# Patient Record
Sex: FEMALE | Race: WHITE | NOT HISPANIC OR LATINO | URBAN - METROPOLITAN AREA
[De-identification: names, ages, dates, MRNs, and addresses within clinical notes are randomized per-mention and may not be internally consistent; named-entity substitution may affect disease eponyms.]

---

## 2017-01-31 ENCOUNTER — APPOINTMENT (RX ONLY)
Dept: URBAN - METROPOLITAN AREA OTHER 9 | Facility: OTHER | Age: 52
Setting detail: DERMATOLOGY
End: 2017-01-31

## 2017-01-31 DIAGNOSIS — L30.8 OTHER SPECIFIED DERMATITIS: ICD-10-CM

## 2017-01-31 DIAGNOSIS — L50.3 DERMATOGRAPHIC URTICARIA: ICD-10-CM

## 2017-01-31 PROBLEM — I10 ESSENTIAL (PRIMARY) HYPERTENSION: Status: ACTIVE | Noted: 2017-01-31

## 2017-01-31 PROCEDURE — ? PRESCRIPTION

## 2017-01-31 PROCEDURE — 99202 OFFICE O/P NEW SF 15 MIN: CPT

## 2017-01-31 PROCEDURE — ? COUNSELING

## 2017-01-31 PROCEDURE — ? TREATMENT REGIMEN

## 2017-01-31 RX ORDER — DESONIDE 0.5 MG/G
AEROSOL, FOAM TOPICAL
Qty: 1 | Refills: 3 | Status: CANCELLED

## 2017-01-31 RX ORDER — DESONIDE 0.5 MG/ML
LOTION TOPICAL
Qty: 1 | Refills: 3 | Status: ERX | COMMUNITY
Start: 2017-01-31

## 2017-01-31 RX ORDER — HYDROXYZINE HYDROCHLORIDE 10 MG/1
TABLET, FILM COATED ORAL QHS
Qty: 30 | Refills: 3 | Status: ERX | COMMUNITY
Start: 2017-01-31

## 2017-01-31 RX ADMIN — DESONIDE: 0.5 LOTION TOPICAL at 14:20

## 2017-01-31 RX ADMIN — HYDROXYZINE HYDROCHLORIDE: 10 TABLET, FILM COATED ORAL at 14:16

## 2017-01-31 ASSESSMENT — LOCATION SIMPLE DESCRIPTION DERM
LOCATION SIMPLE: RIGHT UPPER BACK
LOCATION SIMPLE: RIGHT ANTERIOR NECK

## 2017-01-31 ASSESSMENT — LOCATION ZONE DERM
LOCATION ZONE: NECK
LOCATION ZONE: TRUNK

## 2017-01-31 ASSESSMENT — LOCATION DETAILED DESCRIPTION DERM
LOCATION DETAILED: RIGHT INFERIOR ANTERIOR NECK
LOCATION DETAILED: RIGHT SUPERIOR UPPER BACK

## 2017-01-31 ASSESSMENT — SEVERITY ASSESSMENT: SEVERITY: MILD

## 2017-01-31 NOTE — HPI: RASH
How Severe Is Your Rash?: moderate
Is This A New Presentation, Or A Follow-Up?: Rash
Additional History: Patient states that the rash comes and goes sometimes it is itchy and spotty. Patient states sweat males rash worst.

## 2017-01-31 NOTE — PROCEDURE: TREATMENT REGIMEN
Detail Level: Simple
Otc Regimen: Claritin or Allegra
Samples Given: [place]
Plan: Take Atarax QHS prn for flares. Take Allegra or Claritin QD. Apply desonide BID x 3 days, prn for flares.
Initiate Treatment: Atarax

## 2020-08-26 ENCOUNTER — OFFICE VISIT (OUTPATIENT)
Dept: URBAN - METROPOLITAN AREA CLINIC 96 | Facility: CLINIC | Age: 55
End: 2020-08-26
Payer: COMMERCIAL

## 2020-08-26 ENCOUNTER — WEB ENCOUNTER (OUTPATIENT)
Dept: URBAN - METROPOLITAN AREA CLINIC 96 | Facility: CLINIC | Age: 55
End: 2020-08-26

## 2020-08-26 ENCOUNTER — LAB OUTSIDE AN ENCOUNTER (OUTPATIENT)
Dept: URBAN - METROPOLITAN AREA CLINIC 96 | Facility: CLINIC | Age: 55
End: 2020-08-26

## 2020-08-26 ENCOUNTER — DASHBOARD ENCOUNTERS (OUTPATIENT)
Age: 55
End: 2020-08-26

## 2020-08-26 DIAGNOSIS — K62.5 RECTAL BLEEDING: ICD-10-CM

## 2020-08-26 DIAGNOSIS — K59.00 CONSTIPATION, UNSPECIFIED CONSTIPATION TYPE: ICD-10-CM

## 2020-08-26 DIAGNOSIS — R19.4 CHANGE IN BOWEL HABITS: ICD-10-CM

## 2020-08-26 DIAGNOSIS — Z86.010 PERSONAL HISTORY OF COLONIC POLYPS: ICD-10-CM

## 2020-08-26 DIAGNOSIS — R19.7 DIARRHEA, UNSPECIFIED TYPE: ICD-10-CM

## 2020-08-26 PROBLEM — 428283002: Status: ACTIVE | Noted: 2020-08-25

## 2020-08-26 PROCEDURE — 99204 OFFICE O/P NEW MOD 45 MIN: CPT | Performed by: INTERNAL MEDICINE

## 2020-08-26 PROCEDURE — 99203 OFFICE O/P NEW LOW 30 MIN: CPT | Performed by: INTERNAL MEDICINE

## 2020-08-26 PROCEDURE — G8417 CALC BMI ABV UP PARAM F/U: HCPCS | Performed by: INTERNAL MEDICINE

## 2020-08-26 PROCEDURE — G9903 PT SCRN TBCO ID AS NON USER: HCPCS | Performed by: INTERNAL MEDICINE

## 2020-08-26 PROCEDURE — G8427 DOCREV CUR MEDS BY ELIG CLIN: HCPCS | Performed by: INTERNAL MEDICINE

## 2020-08-26 PROCEDURE — 3017F COLORECTAL CA SCREEN DOC REV: CPT | Performed by: INTERNAL MEDICINE

## 2020-08-26 RX ORDER — ONDANSETRON HYDROCHLORIDE 4 MG/1
1 TABLET AS NEEDED TABLET, FILM COATED ORAL ONCE A DAY
Qty: 2 | Refills: 0 | OUTPATIENT
Start: 2020-08-26

## 2020-08-26 RX ORDER — LOSARTAN POTASSIUM AND HYDROCHLOROTHIAZIDE 50; 12.5 MG/1; MG/1
1 TABLET TABLET, FILM COATED ORAL ONCE A DAY
Status: ACTIVE | COMMUNITY

## 2020-08-26 RX ORDER — SODIUM, POTASSIUM,MAG SULFATES 17.5-3.13G
354ML SOLUTION, RECONSTITUTED, ORAL ORAL AS DIRECTED
Qty: 354 MILLILITER | Refills: 0 | OUTPATIENT
Start: 2020-08-26 | End: 2020-08-27

## 2020-08-26 RX ORDER — ATORVASTATIN CALCIUM 20 MG/1
1 TABLET TABLET, FILM COATED ORAL ONCE A DAY
Status: ACTIVE | COMMUNITY

## 2020-08-26 NOTE — PHYSICAL EXAM GASTROINTESTINAL
Abdomen , soft, nontender, nondistended , no guarding or rigidity , no masses palpable , normal bowel sounds , RECTAL EXAM: no ext lesions, no stool in vault.  Liver and Spleen , no hepatomegaly present , no hepatosplenomegaly , liver nontender , spleen not palpable

## 2020-08-26 NOTE — HPI-TODAY'S VISIT:
This is a 55-year-old female referred by  Dr. Betzy Hahn for some rectal bleeding and diarrhea and constipation.  Patient had a directly scheduled colonoscopy with me on January 27, 2017.  This exam revealed internal hemorrhoids as well as 2 sessile, 2 to 3 mm sigmoid polyps that removed, multiple small polyps in the rectum as well that removed.  Pathology revealed hyperplastic polyps.  Pt says she has never had "normal poops" however last fall she was very constipated and had "dark stools". Pt had BMs then had 3 days and no BMs. Pt uses smooth move or miralax and it worked. In Feb back to her normal stools- she wiped and saw blood. No pain or itching. A month later, she had clumpy stools to normal to gel. Pt says since then, stool very pale and she goes from normal to ribbons to rabbit poops to normal. Pt is eating more protein and goes in the afternoon and takes mg at bedtime. No abdominal pain, no wt loss. Very bloated. Cousins had Crohns. Pt denies GERD. Her mom and siter had gb removed. Pt gets rashes on her neck- showed me pics on her cell that come and go and no one has apparently been able to dx why- she was told due to stress.  Pt works at home, works for [employer]

## 2020-08-28 LAB
A/G RATIO: 2
ALBUMIN: 4.5
ALKALINE PHOSPHATASE: 101
ALT (SGPT): 12
AST (SGOT): 14
BASO (ABSOLUTE): 0.1
BASOS: 1
BILIRUBIN, TOTAL: 0.3
BUN/CREATININE RATIO: 24
BUN: 17
CALCIUM: 9.1
CARBON DIOXIDE, TOTAL: 17
CHLORIDE: 103
CREATININE: 0.71
EGFR IF AFRICN AM: 112
EGFR IF NONAFRICN AM: 97
ENDOMYSIAL ANTIBODY IGA: NEGATIVE
EOS (ABSOLUTE): 0.2
EOS: 2
FREE THYROXINE INDEX: 1.6
GLOBULIN, TOTAL: 2.3
GLUCOSE: 98
HEMATOCRIT: 44.1
HEMATOLOGY COMMENTS:: (no result)
HEMOGLOBIN: 14.9
IMMATURE CELLS: (no result)
IMMATURE GRANS (ABS): 0.1
IMMATURE GRANULOCYTES: 1
IMMUNOGLOBULIN A, QN, SERUM: 262
LYMPHS (ABSOLUTE): 2.5
LYMPHS: 31
MCH: 29.7
MCHC: 33.8
MCV: 88
MONOCYTES(ABSOLUTE): 0.5
MONOCYTES: 6
NEUTROPHILS (ABSOLUTE): 4.8
NEUTROPHILS: 59
NRBC: (no result)
PLATELETS: 389
POTASSIUM: 4.3
PROTEIN, TOTAL: 6.8
RBC: 5.02
RDW: 12.9
SODIUM: 138
T-TRANSGLUTAMINASE (TTG) IGA: <2
T3 UPTAKE: 24
THYROXINE (T4): 6.5
WBC: 8.1

## 2020-09-10 ENCOUNTER — WEB ENCOUNTER (OUTPATIENT)
Dept: URBAN - METROPOLITAN AREA CLINIC 96 | Facility: CLINIC | Age: 55
End: 2020-09-10

## 2020-11-20 ENCOUNTER — OFFICE VISIT (OUTPATIENT)
Dept: URBAN - METROPOLITAN AREA SURGERY CENTER 18 | Facility: SURGERY CENTER | Age: 55
End: 2020-11-20
Payer: COMMERCIAL

## 2020-11-20 ENCOUNTER — CLAIMS CREATED FROM THE CLAIM WINDOW (OUTPATIENT)
Dept: URBAN - METROPOLITAN AREA CLINIC 4 | Facility: CLINIC | Age: 55
End: 2020-11-20
Payer: COMMERCIAL

## 2020-11-20 DIAGNOSIS — D12.2 BENIGN NEOPLASM OF ASCENDING COLON: ICD-10-CM

## 2020-11-20 DIAGNOSIS — D12.3 ADENOMA OF TRANSVERSE COLON: ICD-10-CM

## 2020-11-20 DIAGNOSIS — K63.89 OTHER SPECIFIED DISEASES OF INTESTINE: ICD-10-CM

## 2020-11-20 DIAGNOSIS — R19.7 ACUTE DIARRHEA: ICD-10-CM

## 2020-11-20 DIAGNOSIS — D12.2 ADENOMA OF ASCENDING COLON: ICD-10-CM

## 2020-11-20 DIAGNOSIS — D12.3 BENIGN NEOPLASM OF TRANSVERSE COLON: ICD-10-CM

## 2020-11-20 PROCEDURE — 88305 TISSUE EXAM BY PATHOLOGIST: CPT | Performed by: PATHOLOGY

## 2020-11-20 PROCEDURE — G9937 DIG OR SURV COLSCO: HCPCS | Performed by: INTERNAL MEDICINE

## 2020-11-20 PROCEDURE — 45380 COLONOSCOPY AND BIOPSY: CPT | Performed by: INTERNAL MEDICINE

## 2020-11-20 PROCEDURE — G8907 PT DOC NO EVENTS ON DISCHARG: HCPCS | Performed by: INTERNAL MEDICINE

## 2020-12-04 ENCOUNTER — OFFICE VISIT (OUTPATIENT)
Dept: URBAN - METROPOLITAN AREA SURGERY CENTER 18 | Facility: SURGERY CENTER | Age: 55
End: 2020-12-04

## 2025-01-21 ENCOUNTER — LAB OUTSIDE AN ENCOUNTER (OUTPATIENT)
Dept: URBAN - METROPOLITAN AREA TELEHEALTH 2 | Facility: TELEHEALTH | Age: 60
End: 2025-01-21

## 2025-01-21 PROBLEM — 82934008: Status: ACTIVE | Noted: 2025-01-21

## 2025-01-22 ENCOUNTER — OFFICE VISIT (OUTPATIENT)
Dept: URBAN - METROPOLITAN AREA TELEHEALTH 2 | Facility: TELEHEALTH | Age: 60
End: 2025-01-22
Payer: COMMERCIAL

## 2025-01-22 VITALS — WEIGHT: 222 LBS | BODY MASS INDEX: 37.9 KG/M2 | HEIGHT: 64 IN

## 2025-01-22 DIAGNOSIS — K59.04 CHRONIC IDIOPATHIC CONSTIPATION: ICD-10-CM

## 2025-01-22 DIAGNOSIS — Z86.0101 PERSONAL HISTORY OF ADENOMATOUS AND SERRATED COLON POLYPS: ICD-10-CM

## 2025-01-22 DIAGNOSIS — K64.9 HEMORRHOIDS, UNSPECIFIED HEMORRHOID TYPE: ICD-10-CM

## 2025-01-22 PROCEDURE — 99204 OFFICE O/P NEW MOD 45 MIN: CPT | Performed by: INTERNAL MEDICINE

## 2025-01-22 RX ORDER — ATORVASTATIN CALCIUM 10 MG/1
1 TABLET TABLET, FILM COATED ORAL ONCE A DAY
Status: ACTIVE | COMMUNITY
Start: 2025-01-22

## 2025-01-22 RX ORDER — LOSARTAN POTASSIUM AND HYDROCHLOROTHIAZIDE 50; 12.5 MG/1; MG/1
1 TABLET TABLET, FILM COATED ORAL ONCE A DAY
COMMUNITY

## 2025-01-22 RX ORDER — SERTRALINE HYDROCHLORIDE 50 MG/1
1 TABLET TABLET, FILM COATED ORAL ONCE A DAY
Status: ACTIVE | COMMUNITY

## 2025-01-22 RX ORDER — ATORVASTATIN CALCIUM 20 MG/1
1 TABLET TABLET, FILM COATED ORAL ONCE A DAY
COMMUNITY

## 2025-01-22 RX ORDER — PROGESTERONE 200 MG/1
1 CAPSULE AT BEDTIME CAPSULE ORAL ONCE A DAY
Status: ACTIVE | COMMUNITY
Start: 2025-01-22

## 2025-01-22 RX ORDER — ONDANSETRON HYDROCHLORIDE 4 MG/1
2 TABLETS TABLET, FILM COATED ORAL
Qty: 2 TABLETS | Refills: 0 | OUTPATIENT
Start: 2025-01-22

## 2025-01-22 RX ORDER — ONDANSETRON HYDROCHLORIDE 4 MG/1
1 TABLET AS NEEDED TABLET, FILM COATED ORAL ONCE A DAY
Qty: 2 | Refills: 0 | COMMUNITY
Start: 2020-08-26

## 2025-01-22 RX ORDER — SODIUM, POTASSIUM,MAG SULFATES 17.5-3.13G
AS DIRECTED SOLUTION, RECONSTITUTED, ORAL ORAL AS DIRECTED
Qty: 354 ML | Refills: 0 | OUTPATIENT
Start: 2025-01-22 | End: 2025-01-23

## 2025-01-22 RX ORDER — SPIRONOLACTONE 50 MG/1
1 TABLET TABLET, FILM COATED ORAL ONCE A DAY
Status: ACTIVE | COMMUNITY

## 2025-01-22 NOTE — HPI-TODAY'S VISIT:
This is a 59 year-old female referred by  Dr. Betzy Hahn for a GI evaluation and a copy of the note will bes sent to the ref provider. Patient had a directly scheduled colonoscopy with me on January 27, 2017.  This exam revealed internal hemorrhoids as well as 2 sessile, 2 to 3 mm sigmoid polyps that removed, multiple small polyps in the rectum as well that removed.  Pathology revealed hyperplastic polyps.  Pt says she has never had "normal poops" however last fall she was very constipated and had "dark stools". Pt had BMs then had 3 days and no BMs. Pt uses smooth move or miralax and it worked. In Feb back to her normal stools- she wiped and saw blood. No pain or itching. A month later, she had clumpy stools to normal to gel. Pt says since then, stool very pale and she goes from normal to ribbons to rabbit poops to normal. Pt is eating more protein and goes in the afternoon and takes mg at bedtime. No abdominal pain, no wt loss. Very bloated. Cousins had Crohns. Pt denies GERD. Her mom and sister had gb removed. Pt gets rashes on her neck- showed me pics on her cell that come and go and no one has apparently been able to dx why- she was told due to stress.  Pt works at home, works for [employer]. Today's visit- Pt was seen in 2020 for some constip and diarrhea. Colonoscopy was done on November 20, 2020 for some diarrhea and change in bowel habits.  This revealed internal hemorrhoids and 2 polyps removed from the ascending and transverse colon.  There were also 2 smaller polyps removed from the sigmoid colon.  Her prep was fair.  There was some scattered debris throughout the colon.  Random biopsies were sent to rule out colitis.  Pathology showed a combination of tubular adenomas and hyperplastic polyps and there was no evidence of colitis.  I had recommended a 2-day prep for another colonoscopy in 3 years which would have been November 2023.  It does not appear that she had that procedure done then. Pt says she is still gets some diarrhea. Pt seeing derm for some rashes. Pt has hx fo hemorrhoids and has some bleeding that comes and goes. Pt goes in the am and has urge and goes again around 10am- lots of air and passes ribbons.

## 2025-01-23 ENCOUNTER — TELEPHONE ENCOUNTER (OUTPATIENT)
Dept: URBAN - METROPOLITAN AREA CLINIC 96 | Facility: CLINIC | Age: 60
End: 2025-01-23

## 2025-02-21 ENCOUNTER — CLAIMS CREATED FROM THE CLAIM WINDOW (OUTPATIENT)
Dept: URBAN - METROPOLITAN AREA SURGERY CENTER 18 | Facility: SURGERY CENTER | Age: 60
End: 2025-02-21
Payer: COMMERCIAL

## 2025-02-21 ENCOUNTER — CLAIMS CREATED FROM THE CLAIM WINDOW (OUTPATIENT)
Dept: URBAN - METROPOLITAN AREA CLINIC 4 | Facility: CLINIC | Age: 60
End: 2025-02-21
Payer: COMMERCIAL

## 2025-02-21 DIAGNOSIS — Z86.0100 PERSONAL HISTORY OF COLONIC POLYPS: ICD-10-CM

## 2025-02-21 DIAGNOSIS — D12.2 ADENOMA OF ASCENDING COLON: ICD-10-CM

## 2025-02-21 DIAGNOSIS — D12.0 ADENOMA OF CECUM: ICD-10-CM

## 2025-02-21 DIAGNOSIS — D12.8 ADENOMATOUS POLYP OF RECTUM: ICD-10-CM

## 2025-02-21 DIAGNOSIS — D12.2 ADENOMATOUS POLYP OF ASCENDING COLON: ICD-10-CM

## 2025-02-21 DIAGNOSIS — K62.1 ANAL AND RECTAL POLYP: ICD-10-CM

## 2025-02-21 DIAGNOSIS — K62.1 RECTAL POLYP: ICD-10-CM

## 2025-02-21 DIAGNOSIS — D12.2 BENIGN NEOPLASM OF ASCENDING COLON: ICD-10-CM

## 2025-02-21 DIAGNOSIS — Z86.0100 HISTORY OF COLON POLYPS: ICD-10-CM

## 2025-02-21 DIAGNOSIS — Z09 ENCNTR FOR F/U EXAM AFT TRTMT FOR COND OTH THAN MALIG NEOPLM: ICD-10-CM

## 2025-02-21 DIAGNOSIS — D12.0 BENIGN NEOPLASM OF CECUM: ICD-10-CM

## 2025-02-21 DIAGNOSIS — D12.0 ADENOMATOUS POLYP OF CECUM: ICD-10-CM

## 2025-02-21 PROCEDURE — 45380 COLONOSCOPY AND BIOPSY: CPT | Performed by: INTERNAL MEDICINE

## 2025-02-21 PROCEDURE — 0529F INTRVL 3/>YR PTS CLNSCP DOCD: CPT | Performed by: INTERNAL MEDICINE

## 2025-02-21 PROCEDURE — 88305 TISSUE EXAM BY PATHOLOGIST: CPT | Performed by: PATHOLOGY

## 2025-02-21 PROCEDURE — 00812 ANES LWR INTST SCR COLSC: CPT | Performed by: NURSE ANESTHETIST, CERTIFIED REGISTERED

## 2025-02-21 PROCEDURE — 45385 COLONOSCOPY W/LESION REMOVAL: CPT | Performed by: INTERNAL MEDICINE

## 2025-02-21 RX ORDER — ATORVASTATIN CALCIUM 20 MG/1
1 TABLET TABLET, FILM COATED ORAL ONCE A DAY
Status: ON HOLD | COMMUNITY

## 2025-02-21 RX ORDER — PROGESTERONE 200 MG/1
1 CAPSULE AT BEDTIME CAPSULE ORAL ONCE A DAY
Status: ACTIVE | COMMUNITY
Start: 2025-01-22

## 2025-02-21 RX ORDER — ONDANSETRON HYDROCHLORIDE 4 MG/1
1 TABLET AS NEEDED TABLET, FILM COATED ORAL ONCE A DAY
Qty: 2 | Refills: 0 | Status: ON HOLD | COMMUNITY
Start: 2020-08-26

## 2025-02-21 RX ORDER — SERTRALINE HYDROCHLORIDE 50 MG/1
1 TABLET TABLET, FILM COATED ORAL ONCE A DAY
Status: ACTIVE | COMMUNITY

## 2025-02-21 RX ORDER — SPIRONOLACTONE 50 MG/1
1 TABLET TABLET, FILM COATED ORAL ONCE A DAY
Status: ACTIVE | COMMUNITY

## 2025-02-21 RX ORDER — ATORVASTATIN CALCIUM 10 MG/1
1 TABLET TABLET, FILM COATED ORAL ONCE A DAY
Status: ACTIVE | COMMUNITY
Start: 2025-01-22

## 2025-02-21 RX ORDER — ONDANSETRON HYDROCHLORIDE 4 MG/1
2 TABLETS TABLET, FILM COATED ORAL
Qty: 2 TABLETS | Refills: 0 | Status: ON HOLD | COMMUNITY
Start: 2025-01-22

## 2025-02-21 RX ORDER — LOSARTAN POTASSIUM AND HYDROCHLOROTHIAZIDE 50; 12.5 MG/1; MG/1
1 TABLET TABLET, FILM COATED ORAL ONCE A DAY
Status: ACTIVE | COMMUNITY